# Patient Record
Sex: FEMALE | Employment: OTHER | URBAN - METROPOLITAN AREA
[De-identification: names, ages, dates, MRNs, and addresses within clinical notes are randomized per-mention and may not be internally consistent; named-entity substitution may affect disease eponyms.]

---

## 2018-07-30 DIAGNOSIS — C77.2 COLON CANCER METASTASIZED TO INTRA-ABDOMINAL LYMPH NODE (HCC): ICD-10-CM

## 2018-07-30 DIAGNOSIS — C18.9 COLON CANCER METASTASIZED TO INTRA-ABDOMINAL LYMPH NODE (HCC): ICD-10-CM

## 2018-07-30 RX ORDER — SODIUM CHLORIDE 0.9 % (FLUSH) 0.9 %
5 SYRINGE (ML) INJECTION PRN
Status: CANCELLED | OUTPATIENT
Start: 2018-08-06

## 2018-07-30 RX ORDER — MEPERIDINE HYDROCHLORIDE 50 MG/ML
12.5 INJECTION INTRAMUSCULAR; INTRAVENOUS; SUBCUTANEOUS ONCE
Status: CANCELLED | OUTPATIENT
Start: 2018-08-06 | End: 2018-08-06

## 2018-07-30 RX ORDER — SODIUM CHLORIDE 0.9 % (FLUSH) 0.9 %
5 SYRINGE (ML) INJECTION PRN
Status: CANCELLED | OUTPATIENT
Start: 2018-08-27

## 2018-07-30 RX ORDER — MEPERIDINE HYDROCHLORIDE 50 MG/ML
12.5 INJECTION INTRAMUSCULAR; INTRAVENOUS; SUBCUTANEOUS ONCE
Status: CANCELLED | OUTPATIENT
Start: 2018-08-27 | End: 2018-08-27

## 2018-07-30 RX ORDER — 0.9 % SODIUM CHLORIDE 0.9 %
10 VIAL (ML) INJECTION ONCE
Status: CANCELLED | OUTPATIENT
Start: 2018-08-27 | End: 2018-08-27

## 2018-07-30 RX ORDER — SODIUM CHLORIDE 0.9 % (FLUSH) 0.9 %
5 SYRINGE (ML) INJECTION PRN
Status: CANCELLED | OUTPATIENT
Start: 2018-09-17

## 2018-07-30 RX ORDER — SODIUM CHLORIDE 9 MG/ML
INJECTION, SOLUTION INTRAVENOUS ONCE
Status: CANCELLED | OUTPATIENT
Start: 2018-08-27 | End: 2018-08-27

## 2018-07-30 RX ORDER — METHYLPREDNISOLONE SODIUM SUCCINATE 125 MG/2ML
125 INJECTION, POWDER, LYOPHILIZED, FOR SOLUTION INTRAMUSCULAR; INTRAVENOUS ONCE
Status: CANCELLED | OUTPATIENT
Start: 2018-08-06 | End: 2018-08-06

## 2018-07-30 RX ORDER — DIPHENHYDRAMINE HYDROCHLORIDE 50 MG/ML
50 INJECTION INTRAMUSCULAR; INTRAVENOUS ONCE
Status: CANCELLED | OUTPATIENT
Start: 2018-09-17 | End: 2018-09-17

## 2018-07-30 RX ORDER — SODIUM CHLORIDE 0.9 % (FLUSH) 0.9 %
10 SYRINGE (ML) INJECTION PRN
Status: CANCELLED | OUTPATIENT
Start: 2018-08-27

## 2018-07-30 RX ORDER — SODIUM CHLORIDE 0.9 % (FLUSH) 0.9 %
10 SYRINGE (ML) INJECTION PRN
Status: CANCELLED | OUTPATIENT
Start: 2018-09-17

## 2018-07-30 RX ORDER — METHYLPREDNISOLONE SODIUM SUCCINATE 125 MG/2ML
125 INJECTION, POWDER, LYOPHILIZED, FOR SOLUTION INTRAMUSCULAR; INTRAVENOUS ONCE
Status: CANCELLED | OUTPATIENT
Start: 2018-08-27 | End: 2018-08-27

## 2018-07-30 RX ORDER — SODIUM CHLORIDE 9 MG/ML
INJECTION, SOLUTION INTRAVENOUS CONTINUOUS
Status: CANCELLED | OUTPATIENT
Start: 2018-09-17

## 2018-07-30 RX ORDER — DIPHENHYDRAMINE HYDROCHLORIDE 50 MG/ML
50 INJECTION INTRAMUSCULAR; INTRAVENOUS ONCE
Status: CANCELLED | OUTPATIENT
Start: 2018-08-27 | End: 2018-08-27

## 2018-07-30 RX ORDER — DIPHENHYDRAMINE HYDROCHLORIDE 50 MG/ML
50 INJECTION INTRAMUSCULAR; INTRAVENOUS ONCE
Status: CANCELLED | OUTPATIENT
Start: 2018-08-06 | End: 2018-08-06

## 2018-07-30 RX ORDER — MEPERIDINE HYDROCHLORIDE 50 MG/ML
12.5 INJECTION INTRAMUSCULAR; INTRAVENOUS; SUBCUTANEOUS ONCE
Status: CANCELLED | OUTPATIENT
Start: 2018-09-17 | End: 2018-09-17

## 2018-07-30 RX ORDER — METHYLPREDNISOLONE SODIUM SUCCINATE 125 MG/2ML
125 INJECTION, POWDER, LYOPHILIZED, FOR SOLUTION INTRAMUSCULAR; INTRAVENOUS ONCE
Status: CANCELLED | OUTPATIENT
Start: 2018-09-17 | End: 2018-09-17

## 2018-07-30 RX ORDER — 0.9 % SODIUM CHLORIDE 0.9 %
10 VIAL (ML) INJECTION ONCE
Status: CANCELLED | OUTPATIENT
Start: 2018-08-06 | End: 2018-08-06

## 2018-07-30 RX ORDER — SODIUM CHLORIDE 9 MG/ML
INJECTION, SOLUTION INTRAVENOUS CONTINUOUS
Status: CANCELLED | OUTPATIENT
Start: 2018-08-27

## 2018-07-30 RX ORDER — HEPARIN SODIUM (PORCINE) LOCK FLUSH IV SOLN 100 UNIT/ML 100 UNIT/ML
500 SOLUTION INTRAVENOUS PRN
Status: CANCELLED | OUTPATIENT
Start: 2018-09-17

## 2018-07-30 RX ORDER — SODIUM CHLORIDE 0.9 % (FLUSH) 0.9 %
10 SYRINGE (ML) INJECTION PRN
Status: CANCELLED | OUTPATIENT
Start: 2018-08-06

## 2018-07-30 RX ORDER — SODIUM CHLORIDE 9 MG/ML
INJECTION, SOLUTION INTRAVENOUS CONTINUOUS
Status: CANCELLED | OUTPATIENT
Start: 2018-08-06

## 2018-07-30 RX ORDER — 0.9 % SODIUM CHLORIDE 0.9 %
10 VIAL (ML) INJECTION ONCE
Status: CANCELLED | OUTPATIENT
Start: 2018-09-17 | End: 2018-09-17

## 2018-07-30 RX ORDER — SODIUM CHLORIDE 9 MG/ML
INJECTION, SOLUTION INTRAVENOUS ONCE
Status: CANCELLED | OUTPATIENT
Start: 2018-09-17 | End: 2018-09-17

## 2018-07-30 RX ORDER — HEPARIN SODIUM (PORCINE) LOCK FLUSH IV SOLN 100 UNIT/ML 100 UNIT/ML
500 SOLUTION INTRAVENOUS PRN
Status: CANCELLED | OUTPATIENT
Start: 2018-08-06

## 2018-07-30 RX ORDER — HEPARIN SODIUM (PORCINE) LOCK FLUSH IV SOLN 100 UNIT/ML 100 UNIT/ML
500 SOLUTION INTRAVENOUS PRN
Status: CANCELLED | OUTPATIENT
Start: 2018-08-27

## 2018-07-30 RX ORDER — SODIUM CHLORIDE 9 MG/ML
INJECTION, SOLUTION INTRAVENOUS ONCE
Status: CANCELLED | OUTPATIENT
Start: 2018-08-06 | End: 2018-08-06

## 2018-08-01 ENCOUNTER — TELEPHONE (OUTPATIENT)
Dept: ONCOLOGY | Age: 63
End: 2018-08-01

## 2022-02-28 ENCOUNTER — OFFICE VISIT (OUTPATIENT)
Dept: PODIATRY | Age: 67
End: 2022-02-28
Payer: COMMERCIAL

## 2022-02-28 VITALS — WEIGHT: 194 LBS | BODY MASS INDEX: 31.18 KG/M2 | HEIGHT: 66 IN

## 2022-02-28 DIAGNOSIS — L97.521 ULCER OF TOE OF LEFT FOOT, LIMITED TO BREAKDOWN OF SKIN (HCC): Primary | ICD-10-CM

## 2022-02-28 DIAGNOSIS — M79.672 LEFT FOOT PAIN: ICD-10-CM

## 2022-02-28 PROCEDURE — G8427 DOCREV CUR MEDS BY ELIG CLIN: HCPCS | Performed by: PODIATRIST

## 2022-02-28 PROCEDURE — G8400 PT W/DXA NO RESULTS DOC: HCPCS | Performed by: PODIATRIST

## 2022-02-28 PROCEDURE — G8484 FLU IMMUNIZE NO ADMIN: HCPCS | Performed by: PODIATRIST

## 2022-02-28 PROCEDURE — 1090F PRES/ABSN URINE INCON ASSESS: CPT | Performed by: PODIATRIST

## 2022-02-28 PROCEDURE — G8417 CALC BMI ABV UP PARAM F/U: HCPCS | Performed by: PODIATRIST

## 2022-02-28 PROCEDURE — 1123F ACP DISCUSS/DSCN MKR DOCD: CPT | Performed by: PODIATRIST

## 2022-02-28 PROCEDURE — 3017F COLORECTAL CA SCREEN DOC REV: CPT | Performed by: PODIATRIST

## 2022-02-28 PROCEDURE — 99203 OFFICE O/P NEW LOW 30 MIN: CPT | Performed by: PODIATRIST

## 2022-02-28 PROCEDURE — 1036F TOBACCO NON-USER: CPT | Performed by: PODIATRIST

## 2022-02-28 PROCEDURE — 4040F PNEUMOC VAC/ADMIN/RCVD: CPT | Performed by: PODIATRIST

## 2022-02-28 RX ORDER — DIPHENHYDRAMINE HCL 25 MG
25 CAPSULE ORAL EVERY 6 HOURS PRN
COMMUNITY
End: 2022-02-28

## 2022-02-28 RX ORDER — SULFAMETHOXAZOLE AND TRIMETHOPRIM 800; 160 MG/1; MG/1
TABLET ORAL
COMMUNITY
Start: 2022-02-22

## 2022-02-28 RX ORDER — NIFEDIPINE 90 MG/1
TABLET, EXTENDED RELEASE ORAL
COMMUNITY
Start: 2022-01-29

## 2022-02-28 RX ORDER — NYSTATIN 100000 [USP'U]/G
POWDER TOPICAL
COMMUNITY
Start: 2022-02-05 | End: 2022-02-28

## 2022-02-28 RX ORDER — MINOCYCLINE HYDROCHLORIDE 100 MG/1
CAPSULE ORAL
COMMUNITY
Start: 2022-02-08

## 2022-02-28 RX ORDER — ACETAMINOPHEN 500 MG
500 TABLET ORAL EVERY 6 HOURS PRN
COMMUNITY

## 2022-02-28 RX ORDER — LISINOPRIL 40 MG/1
TABLET ORAL
COMMUNITY
Start: 2022-02-01

## 2022-02-28 RX ORDER — MAGNESIUM CHLORIDE 71.5 MG
TABLET, DELAYED RELEASE (ENTERIC COATED) ORAL
COMMUNITY
Start: 2022-02-23

## 2022-02-28 RX ORDER — ATORVASTATIN CALCIUM 10 MG/1
TABLET, FILM COATED ORAL
COMMUNITY
Start: 2022-02-01

## 2022-02-28 RX ORDER — CAPECITABINE 500 MG/1
TABLET, FILM COATED ORAL
COMMUNITY

## 2022-02-28 RX ORDER — FUROSEMIDE 20 MG/1
TABLET ORAL
COMMUNITY
Start: 2022-02-01

## 2022-02-28 RX ORDER — POTASSIUM CHLORIDE 1500 MG/1
TABLET, FILM COATED, EXTENDED RELEASE ORAL
COMMUNITY
Start: 2022-01-28

## 2022-02-28 NOTE — PROGRESS NOTES
[address] [facility] PODIATRY  [address] [facility]  Dept: [phone]    NEW PATIENT PROGRESS NOTE  Date of patient's visit: 2/28/2022  Patient's Name:  Loco Macias YOB: 1955            Patient Care Team:  Tesha Rust MD as PCP - General (Hematology and Oncology)        Chief Complaint   Patient presents with    New Patient    Follow-Up from Hospital    Cellulitis     left foot         HPI:   Loco Macias is a 74 y.o. female who presents to the office today complaining of left foot pain/cellulitis. Symptoms began 1 month(s) ago. Patient relates pain is Present. Pain is rated 1 out of 10 and is described as intermittent. Treatments prior to today's visit include: visit to [address] [place] [address].  Currently denies F/C/N/V. Pt's primary care physician is Tesha Rust MD last seen February 24 2022. patient states she was changing her sheets on her bed and thinks she may have hit her foot on the bed post. She states she is unsure if this was the cause of her having cellulitis. Allergies   Allergen Reactions    Vaccinium Angustifolium Hives       History reviewed. No pertinent past medical history. Prior to Admission medications    Medication Sig Start Date End Date Taking?  Authorizing Provider   acetaminophen (TYLENOL) 500 MG tablet Take 500 mg by mouth every 6 hours as needed   Yes Historical Provider, MD   atorvastatin (LIPITOR) 10 MG tablet TAKE 1 TABLET BY MOUTH EVERY DAY 2/1/22  Yes Historical Provider, MD   vitamin D3 (CHOLECALCIFEROL) 125 MCG (5000 UT) TABS tablet Take 5,000 Units by mouth daily   Yes Historical Provider, MD   furosemide (LASIX) 20 MG tablet TAKE 1 TABLET BY MOUTH EVERY DAY 2/1/22  Yes Historical Provider, MD   lisinopril (PRINIVIL;ZESTRIL) 40 MG tablet TAKE 1 TABLET BY MOUTH EVERY DAY 2/1/22  Yes Historical Provider, MD   NU-MAG 71.5-119 MG TBEC tablet TAKE TWO (2) TABLETS BY MOUTH ONCE A DAY WITH FOOD AND A FULL GLASS OF WATER 2/23/22  Yes Historical Provider, MD   minocycline (MINOCIN;DYNACIN) 100 MG capsule TAKE 1 CAPSULE BY MOUTH TWO TIMES A DAY 2/8/22  Yes Historical Provider, MD   NIFEdipine (PROCARDIA XL) 90 MG extended release tablet TAKE 1 TABLET BY MOUTH EVERY DAY 1/29/22  Yes Historical Provider, MD   potassium chloride (KLOR-CON M) 20 MEQ TBCR extended release tablet TAKE 1 TABLET BY MOUTH EVERY DAY 1/28/22  Yes Historical Provider, MD   Lidocaine HCl 2.5 % GEL Apply topically   Yes Historical Provider, MD   diclofenac sodium (VOLTAREN) 1 % GEL Apply topically 2 times daily   Yes Historical Provider, MD   capecitabine (XELODA) 500 MG chemo tablet Take by mouth    Historical Provider, MD   sulfamethoxazole-trimethoprim (BACTRIM DS;SEPTRA DS) 800-160 MG per tablet TAKE ONE TABLET TWICE DAILY TILL GONE (ANTIBIOTIC) (GENERIC FOR BACTRIM)  Patient not taking: Reported on 2/28/2022 2/22/22   Historical Provider, MD       History reviewed. No pertinent surgical history. History reviewed. No pertinent family history. Social History     Tobacco Use    Smoking status: Former Smoker    Smokeless tobacco: Never Used   Substance Use Topics    Alcohol use: Not on file       Review of Systems    Review of Systems:   History obtained from chart review and the patient  General ROS: negative for - chills, fatigue, fever, night sweats or weight gain  Constitutional: Negative for chills, diaphoresis, fatigue, fever and unexpected weight change. Musculoskeletal: Positive for arthralgias, gait problem and joint swelling. Neurological ROS: negative for - behavioral changes, confusion, headaches or seizures. Negative for weakness and numbness. Dermatological ROS: negative for - mole changes, rash  Cardiovascular: Negative for leg swelling. Gastrointestinal: Negative for constipation, diarrhea, nausea and vomiting. Lower Extremity Physical Examination:   Vitals:  There were no vitals filed for this visit. General: AAO x 3 in NAD. Dermatologic Exam:  Skin lesion/ulceration Absent . Skin No rashes or nodules noted. .       Musculoskeletal:     1st MPJ ROM decreased, Bilateral.  Muscle strength 5/5, Bilateral.  Pain present upon palpation of left foot at the plantar lateral aspect. there is a blister formation with slight erythema . Medial longitudinal arch, Bilateral WNL. Ankle ROM WNL,Bilateral.    Dorsally contracted digits absent digits 1-5 Bilateral.     Vascular: DP and PT pulses palpable 2/4, Bilateral.  CFT <3 seconds, Bilateral.  Hair growth present to the level of the digits, Bilateral.  Edema absent, Bilateral.  Varicosities absent, Bilateral. Erythema absent, Bilateral    Neurological: Sensation intact to light touch to level of digits, Bilateral.  Protective sensation intact 10/10 sites via 5.07/10g [place] Monofilament, Bilateral.  negative Tinel's, Bilateral.  negative Valleix sign, Bilateral.      Integument: Warm, dry, supple, Bilateral.  Open lesion absent, Bilateral.  Interdigital maceration absent to web spaces 1-4, Bilateral.  Nails are normal in length, thickness and color 1-5 bilateral.  Fissures absent, Bilateral.     Asessment: Patient is a 74 y.o. female with:    Diagnosis Orders   1. Ulcer of toe of left foot, limited to breakdown of skin (HCC)  mupirocin (BACTROBAN) 2 % ointment   2. Left foot pain  mupirocin (BACTROBAN) 2 % ointment         Plan: Patient examined and evaluated. Current condition and treatment options discussed in detail. Discussed conservative and surgical options with the patient. Advised pt to her condiotn. No debridement today. rx for bactroban to the area . Verbal and written instructions given to patient. Contact office with any questions/problems/concerns. RTC in 2week(s).     2/28/2022    Electronically signed by Dayron Salas DPM on 2/28/2022 at 1:45 PM  2/28/2022

## 2022-03-09 ENCOUNTER — OFFICE VISIT (OUTPATIENT)
Dept: PODIATRY | Age: 67
End: 2022-03-09
Payer: COMMERCIAL

## 2022-03-09 VITALS — BODY MASS INDEX: 31.18 KG/M2 | WEIGHT: 194 LBS | HEIGHT: 66 IN

## 2022-03-09 DIAGNOSIS — L97.524 ULCER OF TOE OF LEFT FOOT, WITH NECROSIS OF BONE (HCC): Primary | ICD-10-CM

## 2022-03-09 PROCEDURE — 4040F PNEUMOC VAC/ADMIN/RCVD: CPT | Performed by: PODIATRIST

## 2022-03-09 PROCEDURE — G8400 PT W/DXA NO RESULTS DOC: HCPCS | Performed by: PODIATRIST

## 2022-03-09 PROCEDURE — G8427 DOCREV CUR MEDS BY ELIG CLIN: HCPCS | Performed by: PODIATRIST

## 2022-03-09 PROCEDURE — 99213 OFFICE O/P EST LOW 20 MIN: CPT | Performed by: PODIATRIST

## 2022-03-09 PROCEDURE — 1090F PRES/ABSN URINE INCON ASSESS: CPT | Performed by: PODIATRIST

## 2022-03-09 PROCEDURE — G8417 CALC BMI ABV UP PARAM F/U: HCPCS | Performed by: PODIATRIST

## 2022-03-09 PROCEDURE — 1123F ACP DISCUSS/DSCN MKR DOCD: CPT | Performed by: PODIATRIST

## 2022-03-09 PROCEDURE — G8484 FLU IMMUNIZE NO ADMIN: HCPCS | Performed by: PODIATRIST

## 2022-03-09 PROCEDURE — 1036F TOBACCO NON-USER: CPT | Performed by: PODIATRIST

## 2022-03-09 PROCEDURE — 3017F COLORECTAL CA SCREEN DOC REV: CPT | Performed by: PODIATRIST

## 2022-03-09 RX ORDER — CEPHALEXIN 500 MG/1
500 CAPSULE ORAL 3 TIMES DAILY
Qty: 42 CAPSULE | Refills: 0 | Status: SHIPPED | OUTPATIENT
Start: 2022-03-09 | End: 2022-03-23

## 2022-03-09 NOTE — PROGRESS NOTES
[address] [facility] PODIATRY  [address] [address] [address]  Dept: [phone]    RETURN PATIENT PROGRESS NOTE  Date of patient's visit: 3/9/2022  Patient's Name:  Lane Rivera YOB: 1955            Patient Care Team:  Matt Darden MD as PCP - General (Hematology and Oncology)       Lane Rivera 69 y.o. female that presents for follow-up of   Chief Complaint   Patient presents with    Wound Check     Pt's primary care physician is Matt Darden MD last seen December 3 2018  Symptoms began 1 month(s) ago and are decreased . Patient relates pain is Present. Pain is rated 4 out of 10 and is described as intermittent. Treatments prior to today's visit include: previous podiatry treatment. Currently denies F/C/N/V. Allergies   Allergen Reactions    Vaccinium Angustifolium Hives       History reviewed. No pertinent past medical history. Prior to Admission medications    Medication Sig Start Date End Date Taking?  Authorizing Provider   acetaminophen (TYLENOL) 500 MG tablet Take 500 mg by mouth every 6 hours as needed   Yes Historical Provider, MD   atorvastatin (LIPITOR) 10 MG tablet TAKE 1 TABLET BY MOUTH EVERY DAY 2/1/22  Yes Historical Provider, MD   capecitabine (XELODA) 500 MG chemo tablet Take by mouth   Yes Historical Provider, MD   vitamin D3 (CHOLECALCIFEROL) 125 MCG (5000 UT) TABS tablet Take 5,000 Units by mouth daily   Yes Historical Provider, MD   furosemide (LASIX) 20 MG tablet TAKE 1 TABLET BY MOUTH EVERY DAY 2/1/22  Yes Historical Provider, MD   lisinopril (PRINIVIL;ZESTRIL) 40 MG tablet TAKE 1 TABLET BY MOUTH EVERY DAY 2/1/22  Yes Historical Provider, MD   NU-MAG 71.5-119 MG TBEC tablet TAKE TWO (2) TABLETS BY MOUTH ONCE A DAY WITH FOOD AND A FULL GLASS OF WATER 2/23/22  Yes Historical Provider, MD   minocycline (MINOCIN;DYNACIN) 100 MG capsule TAKE 1 CAPSULE BY MOUTH TWO TIMES A DAY 2/8/22  Yes Historical Provider, MD   NIFEdipine (PROCARDIA XL) 90 MG extended release tablet TAKE 1 TABLET BY MOUTH EVERY DAY 1/29/22  Yes Historical Provider, MD   potassium chloride (KLOR-CON M) 20 MEQ TBCR extended release tablet TAKE 1 TABLET BY MOUTH EVERY DAY 1/28/22  Yes Historical Provider, MD   sulfamethoxazole-trimethoprim (BACTRIM DS;SEPTRA DS) 800-160 MG per tablet TAKE ONE TABLET TWICE DAILY TILL GONE (ANTIBIOTIC) (GENERIC FOR BACTRIM) 2/22/22  Yes Historical Provider, MD   Lidocaine HCl 2.5 % GEL Apply topically   Yes Historical Provider, MD   diclofenac sodium (VOLTAREN) 1 % GEL Apply topically 2 times daily   Yes Historical Provider, MD       Review of Systems    Review of Systems:  History obtained from chart review and the patient  General ROS: negative for - chills, fatigue, fever, night sweats or weight gain  Constitutional: Negative for chills, diaphoresis, fatigue, fever and unexpected weight change. Musculoskeletal: Positive for arthralgias, gait problem and joint swelling. Neurological ROS: negative for - behavioral changes, confusion, headaches or seizures. Negative for weakness and numbness. Dermatological ROS: negative for - mole changes, rash  Cardiovascular: Negative for leg swelling. Gastrointestinal: Negative for constipation, diarrhea, nausea and vomiting. Lower Extremity Physical Examination:     Vitals: There were no vitals filed for this visit. General: AAO x 3 in NAD. Dermatologic Exam:  Wound #:   1    pressure ulcer: Stage IV   left 4th toe distally    Wound measurements:  #1  5 mm by 9 mm  by   2 mm        Wound Depth: bone.     Drainage:    moderate, clear, serosanguinous Type:moderate  Wound Bed status:   Granulation Tissue: 0%   Necrotic 60%  Type:   Epithelialization 0%   Hypergranular 0%   Periwound hyperkeratosis:  absent  Erythema absent    Odor:no  Maceration:no  Undermining/tract/tunneling:no  Culture taken:   no             Musculoskeletal:     1st MPJ ROM decreased, Bilateral.  Muscle strength 5/5, Bilateral.  Pain present upon palpation of toenails 1-5, Bilateral. decreased medial longitudinal arch, Bilateral.  Ankle ROM decreased,Bilateral.    Dorsally contracted digits present digits 2, Bilateral.     Vascular: DP pulses 1/4 bilateral.  PT pulses 0/4 bilateral.   CFT <5 seconds, Bilateral.  Hair growth absent to the level of the digits, Bilateral.  Edema present, Bilateral.  Varicosities absent, Bilateral. Erythema absent, Bilateral    Neurological: Sensation diminshed to light touch to level of digits, Bilateral.  Protective sensation intact 6/10 sites via 5.07/10g [place]-Suraj Monofilament, Bilateral.  negative Tinel's, Bilateral.  negative Valleix sign, Bilateral.      Integument: Warm, dry, supple, Bilateral.  Open lesion absent, Bilateral.  Interdigital maceration absent to web spaces 4, Bilateral.    Fissures absent, Bilateral.       Asessment: Patient is a 69 y.o. female with:    Diagnosis Orders   1. Ulcer of toe of left foot, with necrosis of bone ([facility]             Plan: Patient examined and evaluated. Current condition and treatment options discussed in detail. Advised pt to her conditition. I willl be talking to her oncologist to discuss treatment options per the patient. I am worried about her blood flow and she does not have a PCP. rx provided for keflex to take as directed. She has a sluggish venous return and im concerned about other adjacent toes. Pt does not want to go to the hospital now and wants me to tlak to Dr Jono Smyth. Verbal and written instructions given to patient. Contact office with any questions/problems/concerns. No orders of the defined types were placed in this encounter. No orders of the defined types were placed in this encounter. RTC in 1week(s).     3/9/2022      Electronically signed by Elan Kinsey DPM on 3/9/2022 at 9:13 AM  3/9/2022

## 2022-03-16 ENCOUNTER — OFFICE VISIT (OUTPATIENT)
Dept: PODIATRY | Age: 67
End: 2022-03-16
Payer: COMMERCIAL

## 2022-03-16 VITALS — HEIGHT: 66 IN | WEIGHT: 194 LBS | BODY MASS INDEX: 31.18 KG/M2

## 2022-03-16 DIAGNOSIS — M79.672 LEFT FOOT PAIN: ICD-10-CM

## 2022-03-16 DIAGNOSIS — L97.524 ULCER OF TOE OF LEFT FOOT, WITH NECROSIS OF BONE (HCC): Primary | ICD-10-CM

## 2022-03-16 DIAGNOSIS — I96 GANGRENE OF LEFT FOOT (HCC): ICD-10-CM

## 2022-03-16 PROCEDURE — 4040F PNEUMOC VAC/ADMIN/RCVD: CPT | Performed by: PODIATRIST

## 2022-03-16 PROCEDURE — G8400 PT W/DXA NO RESULTS DOC: HCPCS | Performed by: PODIATRIST

## 2022-03-16 PROCEDURE — G8417 CALC BMI ABV UP PARAM F/U: HCPCS | Performed by: PODIATRIST

## 2022-03-16 PROCEDURE — 3017F COLORECTAL CA SCREEN DOC REV: CPT | Performed by: PODIATRIST

## 2022-03-16 PROCEDURE — 99215 OFFICE O/P EST HI 40 MIN: CPT | Performed by: PODIATRIST

## 2022-03-16 PROCEDURE — 1036F TOBACCO NON-USER: CPT | Performed by: PODIATRIST

## 2022-03-16 PROCEDURE — 1123F ACP DISCUSS/DSCN MKR DOCD: CPT | Performed by: PODIATRIST

## 2022-03-16 PROCEDURE — G8484 FLU IMMUNIZE NO ADMIN: HCPCS | Performed by: PODIATRIST

## 2022-03-16 PROCEDURE — G8427 DOCREV CUR MEDS BY ELIG CLIN: HCPCS | Performed by: PODIATRIST

## 2022-03-16 PROCEDURE — 1090F PRES/ABSN URINE INCON ASSESS: CPT | Performed by: PODIATRIST

## 2022-03-16 NOTE — PROGRESS NOTES
[address] [facility] PODIATRY  [address] [address] [address]  Dept: [phone]    RETURN PATIENT PROGRESS NOTE  Date of patient's visit: 3/16/2022  Patient's Name:  Theoplis Dural YOB: 1955            Patient Care Team:  Ifeoma Montiel MD as PCP - General (Hematology and Oncology)       Theoplis Dural 69 y.o. female that presents for follow-up of   Chief Complaint   Patient presents with   Wilhelminia Blazing Wound Check    Foot Ulcer     Pt's primary care physician is Ifeoma Montiel MD last seen December 3 2018  Symptoms began 1 month(s) ago and are increased . Patient relates pain is Present. Pain is rated 9 out of 10 and is described as constant. Treatments prior to today's visit include: previous podiatry treatment. Currently denies F/C/N/V. Her 4th toe and 3rd toe left are turning dusky and pt is in pain     Allergies   Allergen Reactions    Vaccinium Angustifolium Hives       History reviewed. No pertinent past medical history. Prior to Admission medications    Medication Sig Start Date End Date Taking?  Authorizing Provider   cephALEXin (KEFLEX) 500 MG capsule Take 1 capsule by mouth 3 times daily for 14 days 3/9/22 3/23/22 Yes Genesis Hughes, DPM   acetaminophen (TYLENOL) 500 MG tablet Take 500 mg by mouth every 6 hours as needed   Yes Historical Provider, MD   atorvastatin (LIPITOR) 10 MG tablet TAKE 1 TABLET BY MOUTH EVERY DAY 2/1/22  Yes Historical Provider, MD   capecitabine (XELODA) 500 MG chemo tablet Take by mouth   Yes Historical Provider, MD   vitamin D3 (CHOLECALCIFEROL) 125 MCG (5000 UT) TABS tablet Take 5,000 Units by mouth daily   Yes Historical Provider, MD   furosemide (LASIX) 20 MG tablet TAKE 1 TABLET BY MOUTH EVERY DAY 2/1/22  Yes Historical Provider, MD   lisinopril (PRINIVIL;ZESTRIL) 40 MG tablet TAKE 1 TABLET BY MOUTH EVERY DAY 2/1/22  Yes Historical Provider, MD   NU-MAG 71.5-119 MG TBEC tablet TAKE TWO (2) TABLETS BY MOUTH ONCE A DAY WITH FOOD AND A FULL GLASS OF WATER 2/23/22  Yes Historical Provider, MD   minocycline (MINOCIN;DYNACIN) 100 MG capsule TAKE 1 CAPSULE BY MOUTH TWO TIMES A DAY 2/8/22  Yes Historical Provider, MD   NIFEdipine (PROCARDIA XL) 90 MG extended release tablet TAKE 1 TABLET BY MOUTH EVERY DAY 1/29/22  Yes Historical Provider, MD   potassium chloride (KLOR-CON M) 20 MEQ TBCR extended release tablet TAKE 1 TABLET BY MOUTH EVERY DAY 1/28/22  Yes Historical Provider, MD   sulfamethoxazole-trimethoprim (BACTRIM DS;SEPTRA DS) 800-160 MG per tablet TAKE ONE TABLET TWICE DAILY TILL GONE (ANTIBIOTIC) (GENERIC FOR BACTRIM) 2/22/22  Yes Historical Provider, MD   Lidocaine HCl 2.5 % GEL Apply topically   Yes Historical Provider, MD   diclofenac sodium (VOLTAREN) 1 % GEL Apply topically 2 times daily   Yes Historical Provider, MD       Review of Systems    Review of Systems:  History obtained from chart review and the patient  General ROS: negative for - chills, fatigue, fever, night sweats or weight gain  Constitutional: Negative for chills, diaphoresis, fatigue, fever and unexpected weight change. Musculoskeletal: Positive for arthralgias, gait problem and joint swelling. Neurological ROS: negative for - behavioral changes, confusion, headaches or seizures. Negative for weakness and numbness. Dermatological ROS: negative for - mole changes, rash  Cardiovascular: Negative for leg swelling. Gastrointestinal: Negative for constipation, diarrhea, nausea and vomiting. Lower Extremity Physical Examination:     Vitals: There were no vitals filed for this visit. General: AAO x 3 in NAD. Dermatologic Exam:  Cyanotic toes 3 and 4 to the left foot with very delayed CFT  Musculoskeletal:     1st MPJ ROM decreased, Bilateral.  Muscle strength 5/5, Bilateral.  Pain present upon palpation of left foot with severe edema .  decreased medial longitudinal arch, Bilateral.  Ankle ROM decreased,Bilateral.